# Patient Record
Sex: MALE | Race: WHITE | HISPANIC OR LATINO | Employment: FULL TIME | URBAN - METROPOLITAN AREA
[De-identification: names, ages, dates, MRNs, and addresses within clinical notes are randomized per-mention and may not be internally consistent; named-entity substitution may affect disease eponyms.]

---

## 2023-06-17 ENCOUNTER — HOSPITAL ENCOUNTER (EMERGENCY)
Facility: MEDICAL CENTER | Age: 43
End: 2023-06-17
Attending: EMERGENCY MEDICINE

## 2023-06-17 ENCOUNTER — APPOINTMENT (OUTPATIENT)
Dept: RADIOLOGY | Facility: MEDICAL CENTER | Age: 43
End: 2023-06-17
Attending: EMERGENCY MEDICINE

## 2023-06-17 VITALS
TEMPERATURE: 98 F | SYSTOLIC BLOOD PRESSURE: 133 MMHG | RESPIRATION RATE: 18 BRPM | OXYGEN SATURATION: 97 % | WEIGHT: 171.52 LBS | HEART RATE: 78 BPM | DIASTOLIC BLOOD PRESSURE: 85 MMHG | BODY MASS INDEX: 26.92 KG/M2 | HEIGHT: 67 IN

## 2023-06-17 DIAGNOSIS — S61.212A LACERATION OF RIGHT MIDDLE FINGER WITHOUT FOREIGN BODY WITHOUT DAMAGE TO NAIL, INITIAL ENCOUNTER: ICD-10-CM

## 2023-06-17 DIAGNOSIS — T14.8XXA BONE BRUISE: ICD-10-CM

## 2023-06-17 PROCEDURE — 700111 HCHG RX REV CODE 636 W/ 250 OVERRIDE (IP): Performed by: EMERGENCY MEDICINE

## 2023-06-17 PROCEDURE — A9270 NON-COVERED ITEM OR SERVICE: HCPCS | Performed by: EMERGENCY MEDICINE

## 2023-06-17 PROCEDURE — 90715 TDAP VACCINE 7 YRS/> IM: CPT | Performed by: EMERGENCY MEDICINE

## 2023-06-17 PROCEDURE — 700102 HCHG RX REV CODE 250 W/ 637 OVERRIDE(OP): Performed by: EMERGENCY MEDICINE

## 2023-06-17 PROCEDURE — 73140 X-RAY EXAM OF FINGER(S): CPT | Mod: RT

## 2023-06-17 PROCEDURE — 304217 HCHG IRRIGATION SYSTEM

## 2023-06-17 PROCEDURE — 90471 IMMUNIZATION ADMIN: CPT

## 2023-06-17 PROCEDURE — 99283 EMERGENCY DEPT VISIT LOW MDM: CPT

## 2023-06-17 RX ORDER — ACETAMINOPHEN 325 MG/1
650 TABLET ORAL ONCE
Status: COMPLETED | OUTPATIENT
Start: 2023-06-17 | End: 2023-06-17

## 2023-06-17 RX ORDER — IBUPROFEN 600 MG/1
600 TABLET ORAL ONCE
Status: COMPLETED | OUTPATIENT
Start: 2023-06-17 | End: 2023-06-17

## 2023-06-17 RX ADMIN — CLOSTRIDIUM TETANI TOXOID ANTIGEN (FORMALDEHYDE INACTIVATED), CORYNEBACTERIUM DIPHTHERIAE TOXOID ANTIGEN (FORMALDEHYDE INACTIVATED), BORDETELLA PERTUSSIS TOXOID ANTIGEN (GLUTARALDEHYDE INACTIVATED), BORDETELLA PERTUSSIS FILAMENTOUS HEMAGGLUTININ ANTIGEN (FORMALDEHYDE INACTIVATED), BORDETELLA PERTUSSIS PERTACTIN ANTIGEN, AND BORDETELLA PERTUSSIS FIMBRIAE 2/3 ANTIGEN 0.5 ML: 5; 2; 2.5; 5; 3; 5 INJECTION, SUSPENSION INTRAMUSCULAR at 23:06

## 2023-06-17 RX ADMIN — ACETAMINOPHEN 650 MG: 325 TABLET, FILM COATED ORAL at 23:06

## 2023-06-17 RX ADMIN — IBUPROFEN 600 MG: 600 TABLET, FILM COATED ORAL at 23:06

## 2023-06-17 ASSESSMENT — PAIN DESCRIPTION - PAIN TYPE: TYPE: ACUTE PAIN

## 2023-06-18 NOTE — ED TRIAGE NOTES
"Chief Complaint   Patient presents with    Laceration     The pt sustained a 1 cm lac to left middle finger after a steel metal door fell onto hand. Event happened one hour ago. Bleeding controlled, minor swelling to middle finger       Pt ambulatory to triage. Pt A&Ox4, for the above complaint.     Pt to lobby . Pt educated on alerting staff in changes to condition. Pt verbalized understanding.      BP (!) 134/94   Pulse (!) 109   Temp 36.6 °C (97.8 °F) (Temporal)   Resp 16   Ht 1.702 m (5' 7\")   Wt 77.8 kg (171 lb 8.3 oz)   SpO2 98%   BMI 26.86 kg/m²     "

## 2023-06-18 NOTE — ED NOTES
Irrigation to left middle finger using 1000 mL of sterile water. Pt tolerated well. Primary RN aware.

## 2023-06-18 NOTE — ED NOTES
Pt signed and given d/c instructions in Hungarian. After being medicated per MAR pt verbalizes pain improvement. Discussed f/u if needed. Pt denies further questions/concerns. Pt ambulated out of the dept w/ steady gait A&O x4 w/ all belongings

## 2023-06-18 NOTE — ED NOTES
Pt ambulated to room without assistance. Connected to monitor with call light in reach. Translation line at bedside. Chart up for ERP

## 2023-06-18 NOTE — ED PROVIDER NOTES
"ED Provider Note    CHIEF COMPLAINT  Chief Complaint   Patient presents with    Laceration     The pt sustained a 1 cm lac to left middle finger after a steel metal door fell onto hand. Event happened one hour ago. Bleeding controlled, minor swelling to middle finger       EXTERNAL RECORDS REVIEWED  Other none    HPI/ROS  LIMITATION TO HISTORY   Select: Language Kyrgyz,  Used   OUTSIDE HISTORIAN(S):  None    Moses Negron is a 43 y.o. male who presents to the emerged part chief plaint of right middle finger pain.  He states that a [facility] fell onto his hand ever since that he had pinkeye radiates up to his arm.  He states he move the finger but hurts to do so.  No weakness numbness or tingling.  His tetanus is not up-to-date but unfortunately he is right-hand dominant.    PAST MEDICAL HISTORY       SURGICAL HISTORY  patient denies any surgical history    FAMILY HISTORY  No family history on file.    SOCIAL HISTORY  Social History     Tobacco Use    Smoking status: Never    Smokeless tobacco: Not on file   Substance and Sexual Activity    Alcohol use: No    Drug use: No    Sexual activity: Not on file       CURRENT MEDICATIONS  Home Medications       Reviewed by Ervin Oconnell R.N. (Registered Nurse) on 06/17/23 at 7402  Med List Status: Partial     Medication Last Dose Status   methylPREDNISolone (MEDROL DOSPACK) 4 MG Tab  Active                    ALLERGIES  No Known Allergies    PHYSICAL EXAM  VITAL SIGNS: BP (!) 134/94   Pulse (!) 109   Temp 36.6 °C (97.8 °F) (Temporal)   Resp 16   Ht 1.702 m (5' 7\")   Wt 77.8 kg (171 lb 8.3 oz)   SpO2 98%   BMI 26.86 kg/m²    Pulse OX: Pulse Oxygen level is within normal limits  Constitutional: Alert in no apparent distress.  HENT: Normocephalic, Atraumatic, MMM  Eyes: PERound. Conjunctiva normal, non-icteric.   Heart: Regular rate and rhythm, intact distal pulses   Lungs: Symmetrical movement, no resp distress   Abdomen: Non-tender, non-distended, " normal bowel sounds  EXT/Back right middle finger on the dorsal aspect over the proximal phalanx there is a small half centimeter superficial laceration which is semicircular no active bleeding full flexion and extension although it is slow secondary to pain at DIP and PIP  Skin: Warm, Dry, No erythema, No rash.   Neurologic: Alert and oriented, Grossly non-focal.       DIAGNOSTIC STUDIES / PROCEDURES      RADIOLOGY  I have independently interpreted the diagnostic imaging associated with this visit and am waiting the final reading from the radiologist.   My preliminary interpretation is as follows:     Xr finger   No fx or foreign body    Radiologist interpretation:   DX-FINGER(S) 2+ RIGHT   Final Result      No radiographic evidence of acute traumatic bony injury.            COURSE & MEDICAL DECISION MAKING    ED Observation Status? No; Patient does not meet criteria for ED Observation.     INITIAL ASSESSMENT, COURSE AND PLAN/ DISPOSITION AND DISCUSSIONS  Care Narrative: Patient presents emergency department evidence of likely contusion bone bruise and superficial lacerations not actively bleeding.  X-ray was performed shows no evidence of fracture he was treated ibuprofen Tylenol as well as a tetanus update.  His wound was washed out thoroughly.  We discussed bone bruise and treatment at home with ice packs.    I have discussed management of the patient with the following physicians and DUC's:  none    Discussion of management with other QHP or appropriate source(s): None     Escalation of care considered, and ultimately not performed:blood analysis    Barriers to care at this time, including but not limited to: Patient does not have established PCP.     Decision tools and prescription drugs considered including, but not limited to: Antibiotics were not indicated at this time .    The patient will return for new or worsening symptoms and is stable at the time of discharge.    The patient is referred to a primary  physician for blood pressure management, diabetic screening, and for all other preventative health concerns.    DISPOSITION:  Patient will be discharged home in stable condition.    FOLLOW UP:  [facility], Emergency Dept  [address] [facility] [address] [phone]    If symptoms worsen      OUTPATIENT MEDICATIONS:  New Prescriptions    No medications on file         FINAL DIAGNOSIS  1. Laceration of right middle finger without foreign body without damage to nail, initial encounter    2. Bone bruise           Electronically signed by: Cindy Ramirez M.D., 6/17/2023 10:29 PM